# Patient Record
Sex: FEMALE | Race: WHITE | NOT HISPANIC OR LATINO | URBAN - METROPOLITAN AREA
[De-identification: names, ages, dates, MRNs, and addresses within clinical notes are randomized per-mention and may not be internally consistent; named-entity substitution may affect disease eponyms.]

---

## 2024-05-06 ENCOUNTER — OFFICE (OUTPATIENT)
Dept: URBAN - METROPOLITAN AREA CLINIC 11 | Facility: CLINIC | Age: 54
End: 2024-05-06

## 2024-05-06 VITALS
OXYGEN SATURATION: 99 % | DIASTOLIC BLOOD PRESSURE: 94 MMHG | SYSTOLIC BLOOD PRESSURE: 144 MMHG | HEIGHT: 66 IN | DIASTOLIC BLOOD PRESSURE: 92 MMHG | SYSTOLIC BLOOD PRESSURE: 136 MMHG | HEART RATE: 69 BPM | WEIGHT: 127 LBS

## 2024-05-06 DIAGNOSIS — R11.0 NAUSEA: ICD-10-CM

## 2024-05-06 DIAGNOSIS — R10.13 EPIGASTRIC PAIN: ICD-10-CM

## 2024-05-06 DIAGNOSIS — K76.89 OTHER SPECIFIED DISEASES OF LIVER: ICD-10-CM

## 2024-05-06 DIAGNOSIS — R63.4 ABNORMAL WEIGHT LOSS: ICD-10-CM

## 2024-05-06 DIAGNOSIS — R19.7 DIARRHEA, UNSPECIFIED: ICD-10-CM

## 2024-05-06 DIAGNOSIS — Z85.43 PERSONAL HISTORY OF MALIGNANT NEOPLASM OF OVARY: ICD-10-CM

## 2024-05-06 PROCEDURE — 99204 OFFICE O/P NEW MOD 45 MIN: CPT | Performed by: NURSE PRACTITIONER

## 2024-05-06 RX ORDER — SODIUM PICOSULFATE, MAGNESIUM OXIDE, AND ANHYDROUS CITRIC ACID 12; 3.5; 1 G/175ML; G/175ML; MG/175ML
LIQUID ORAL
Qty: 1 | Refills: 0 | Status: ACTIVE
Start: 2024-05-06

## 2024-05-06 RX ORDER — PANTOPRAZOLE SODIUM 40 MG/1
40 TABLET, DELAYED RELEASE ORAL
Qty: 30 | Refills: 4 | Status: ACTIVE
Start: 2024-05-06

## 2024-05-06 NOTE — SERVICENOTES
will get EGD and colonoscopy to further evaluate epigastric pain, nausea, weight loss.  She does have a history of ovarian cancer.  She had simple hepatic cyst on CT scan.  A son is very concerned and would like further imaging. Will get CT liver mass protocol.  She was given a Z-Anmol and Medrol Dosepak in the ER for enteritis/gastritis.  Discussed getting stool studies   First to see if antibiotics would be appropriate.   She will start the pantoprazole and discussed staying on the medication for at least a month.   Will see back in 2 months or sooner if needed ** she had a CT of her chest in the ER as well that did not show any suspicious lesions.

## 2024-05-06 NOTE — SERVICEHPINOTES
Ms. Rehman is a 53 year old female here today for ER follow up.  She started having epigastric pain, nausea and went to [facility] ER last night. She had complaints of shortness of breath and burning chest pain when she was at the ER. She had a CT scan of the abdomen pelvis that showed suspected distal gastritis with mild enteritis, simple hepatic cyst and pulmonary emphysema. Her proBNP was elevated but the rest of her lab work was unremarkable. She had a normal lipase level and normal troponins. She states last night she had overwhelming nausea and felt like a fire ball was pushing up in her epigastric region causing her to be short of breath.  Two weeks ago, she had watery diarrhea 10 times a day that lasted for 6 days.   She has had some formed bowel movements but her last episode of diarrhea was Sunday.  She has had over a 40 lb weight loss over the last few months and has had a decreased appetite.   She has a history of ovarian cancer in 2018 and was followed by DR. Krueger.  Her last colonoscopy was several years ago and she does not recall who did it.  She denies any vomiting or blood in stool.